# Patient Record
(demographics unavailable — no encounter records)

---

## 2024-12-17 NOTE — HISTORY OF PRESENT ILLNESS
[de-identified] : 67 y/o female presenting with lower back pain x 2 months. Pain is progressively worsening and is aggravated by prolonged sitting or walking. It radiates into her legs, worse on her left side. denies recent illness, fevers, numbness, weakness, balance problems, saddle anesthesia, urinary retention or fecal incontinence. Since previous visit, patient has an acute exacerbation of lower back and bilateral knee pain. She is interested in restarting PT.

## 2024-12-17 NOTE — PROCEDURE
[de-identified] : Procedure: Joint injection with corticosteroid Pre-Procedure Diagnosis: Left knee pain Post-Procedure Diagnosis: same   The patient was educated about the risks and benefits of a corticosteroid injection.  Alternatives were discussed.  The patient understood and consented for the procedure.   The area was sterilely prepped using isopropyl alcohol.  An ethyl chloride spray provided  local anesthesia.  Using the usual sterile technique, 1 ml of 40mg/1ml of Kenalog and 2 ml of Lidocaine 1% without epinephrine was injected into the joint.  A dressing was applied to the area.  The patient tolerated the procedure well and without complication.

## 2024-12-17 NOTE — PHYSICAL EXAM
[de-identified] :  General: No acute distress, conversant, well-nourished. Head: Normocephalic, atraumatic Neck: trachea midline, FROM Heart: normotensive and normal rate and rhythm Lungs: No labored breathing Skin: No abrasions, no rashes, no edema Psych: Alert and oriented to person, place and time Extremities: no peripheral edema or digital cyanosis Gait: Normal gait. Can perform tandem gait.  Vascular: warm and well perfused distally, palpable distal pulses  MSK: Lumbar spine: No tenderness to palpation.  No step-off, no deformity.   NEURO EXAM: Sensation Left L2  -  2/2            Left L3  -  2/2 Left L4  -  2/2 Left L5  -  2/2 Left S1  -  2/2   Right L2  -  2/2            Right L3  -  2/2 Right L4  -  2/2 Right L5  -  2/2 Right S1  -  2/2   Motor: Left L2 (hip flexion)                            5/5                Left L3 (knee extension)                   5/5                Left L4 (ankle dorsiflexion)                 5/5                Left L5 (long toe extensor)                5/5                Left S1 (ankle plantar flexion)           5/5   Right L2 (hip flexion)                            5/5                Right L3 (knee extension)                   5/5                Right L4 (ankle dorsiflexion)                 5/5                Right L5 (long toe extensor)                5/5                Right S1 (ankle plantar flexion)           5/5   Reflexes: Normal and symmetric Negative clonus.  Down-going Babinski.  Knee with no erythema, no effusion.  No tenderness to palpation of knee. No medial joint line tenderness. No lateral joint line tenderness.   Range of motion: 0 - 130 degrees No pain with range of motion.   Negative Art's test. Stable to varus and valgus stress. Negative Lachman's test, negative anterior and posterior drawer tests.    Sensation intact to light touch.  Normal motor exam. Warm and well perfused distally.

## 2024-12-17 NOTE — HISTORY OF PRESENT ILLNESS
[de-identified] : 67 y/o female presenting with lower back pain x 2 months. Pain is progressively worsening and is aggravated by prolonged sitting or walking. It radiates into her legs, worse on her left side. denies recent illness, fevers, numbness, weakness, balance problems, saddle anesthesia, urinary retention or fecal incontinence. Since previous visit, patient has an acute exacerbation of lower back and bilateral knee pain. She is interested in restarting PT.

## 2024-12-17 NOTE — DISCUSSION/SUMMARY
[de-identified] :  I, Dr. Johnson personally performed the evaluation and management services for this established patient who presents today with (a) new problem(s)/exacerbation of (an) existing condition(s). That E/M includes conducting the clinically appropriate interval history &/or exam, assessing all new/exacerbated conditions, and establishing a new plan of care. Today, my ANASTACIO, Stanislaw Bundy was here to observe my evaluation and management service for this new problem/exacerbated condition and follow the plan of care established by me going forward.

## 2024-12-17 NOTE — ASSESSMENT
[FreeTextEntry1] : 69 y/o female presenting with lower back pain x 2 months. Pain is progressively worsening and is aggravated by prolonged sitting or walking. It radiates into her legs, worse on her left side. denies recent illness, fevers, numbness, weakness, balance problems, saddle anesthesia, urinary retention or fecal incontinence. Since previous visit, patient has an acute exacerbation of lower back and bilateral knee pain. She is interested in restarting PT. We discussed treatment options including physical therapy, medications, spinal injections and surgery. Surgery would be lumbar decompression. Patient would like to avoid surgery and injections. The patient was given a referral for physical therapy. Start gabapentin. CSI given to left knee. Patient tolerated the procedure well. If patient sees improvement, she will consider CSI for right knee. F/U in 4 weeks. We discussed red flag symptoms that would require emergent evaluation. She knows to call with any questions or concerns or if her symptoms acutely worsen.

## 2024-12-17 NOTE — REASON FOR VISIT
[Follow-Up Visit] : a follow-up visit for [Back Pain] : back pain [Other: ____] : [unfilled] [FreeTextEntry2] : pain level 5/10, needs new physical therapy referral, need letter for jury duty

## 2024-12-17 NOTE — PROCEDURE
[de-identified] : Procedure: Joint injection with corticosteroid Pre-Procedure Diagnosis: Left knee pain Post-Procedure Diagnosis: same   The patient was educated about the risks and benefits of a corticosteroid injection.  Alternatives were discussed.  The patient understood and consented for the procedure.   The area was sterilely prepped using isopropyl alcohol.  An ethyl chloride spray provided  local anesthesia.  Using the usual sterile technique, 1 ml of 40mg/1ml of Kenalog and 2 ml of Lidocaine 1% without epinephrine was injected into the joint.  A dressing was applied to the area.  The patient tolerated the procedure well and without complication.

## 2024-12-17 NOTE — DISCUSSION/SUMMARY
[de-identified] :  I, Dr. Johnson personally performed the evaluation and management services for this established patient who presents today with (a) new problem(s)/exacerbation of (an) existing condition(s). That E/M includes conducting the clinically appropriate interval history &/or exam, assessing all new/exacerbated conditions, and establishing a new plan of care. Today, my ANASTACIO, Stanislaw Bundy was here to observe my evaluation and management service for this new problem/exacerbated condition and follow the plan of care established by me going forward.

## 2024-12-17 NOTE — PHYSICAL EXAM
[de-identified] :  General: No acute distress, conversant, well-nourished. Head: Normocephalic, atraumatic Neck: trachea midline, FROM Heart: normotensive and normal rate and rhythm Lungs: No labored breathing Skin: No abrasions, no rashes, no edema Psych: Alert and oriented to person, place and time Extremities: no peripheral edema or digital cyanosis Gait: Normal gait. Can perform tandem gait.  Vascular: warm and well perfused distally, palpable distal pulses  MSK: Lumbar spine: No tenderness to palpation.  No step-off, no deformity.   NEURO EXAM: Sensation Left L2  -  2/2            Left L3  -  2/2 Left L4  -  2/2 Left L5  -  2/2 Left S1  -  2/2   Right L2  -  2/2            Right L3  -  2/2 Right L4  -  2/2 Right L5  -  2/2 Right S1  -  2/2   Motor: Left L2 (hip flexion)                            5/5                Left L3 (knee extension)                   5/5                Left L4 (ankle dorsiflexion)                 5/5                Left L5 (long toe extensor)                5/5                Left S1 (ankle plantar flexion)           5/5   Right L2 (hip flexion)                            5/5                Right L3 (knee extension)                   5/5                Right L4 (ankle dorsiflexion)                 5/5                Right L5 (long toe extensor)                5/5                Right S1 (ankle plantar flexion)           5/5   Reflexes: Normal and symmetric Negative clonus.  Down-going Babinski.  Knee with no erythema, no effusion.  No tenderness to palpation of knee. No medial joint line tenderness. No lateral joint line tenderness.   Range of motion: 0 - 130 degrees No pain with range of motion.   Negative Art's test. Stable to varus and valgus stress. Negative Lachman's test, negative anterior and posterior drawer tests.    Sensation intact to light touch.  Normal motor exam. Warm and well perfused distally.

## 2024-12-17 NOTE — ASSESSMENT
[FreeTextEntry1] : 69 y/o female presenting with lower back pain x 2 months. Pain is progressively worsening and is aggravated by prolonged sitting or walking. It radiates into her legs, worse on her left side. denies recent illness, fevers, numbness, weakness, balance problems, saddle anesthesia, urinary retention or fecal incontinence. Since previous visit, patient has an acute exacerbation of lower back and bilateral knee pain. She is interested in restarting PT. We discussed treatment options including physical therapy, medications, spinal injections and surgery. Surgery would be lumbar decompression. Patient would like to avoid surgery and injections. The patient was given a referral for physical therapy. Start gabapentin. CSI given to left knee. Patient tolerated the procedure well. If patient sees improvement, she will consider CSI for right knee. F/U in 4 weeks. We discussed red flag symptoms that would require emergent evaluation. She knows to call with any questions or concerns or if her symptoms acutely worsen.  no suicidal ideation/no depression/no anxiety

## 2025-01-26 NOTE — ASSESSMENT
[FreeTextEntry1] : 67 y/o female followup with lower back pain. Pain is progressively worsening and is aggravated by prolonged sitting or walking. It radiates into her legs, worse on her left side. denies recent illness, fevers, numbness, weakness, balance problems, saddle anesthesia, urinary retention or fecal incontinence. PT has helped. She was given bilateral knee CSI which she tolerated well.  Ordered HA injections.  The patient was given a referral for physical therapy.  Continue gabapentin. F/U for HA injections. We discussed red flag symptoms that would require emergent evaluation. She knows to call with any questions or concerns or if her symptoms acutely worsen.

## 2025-01-26 NOTE — PROCEDURE
[de-identified] : Procedure: Right knee Joint injection with corticosteroid Pre-Procedure Diagnosis: right knee pain Post-Procedure Diagnosis: same  The patient was educated about the risks and benefits of a corticosteroid injection.  Alternatives were discussed.  The patient understood and consented for the procedure.  The area was sterilely prepped using isopropyl alcohol.  An ethyl chloride spray provided  local anesthesia.  Using the usual sterile technique, 1 ml of 40mg/1ml of Kenalog and 4 ml of Lidocaine 1% without epinephrine was injected into the joint.  A dressing was applied to the area.  The patient tolerated the procedure well and without complication.  Procedure: Left knee Joint injection with corticosteroid Pre-Procedure Diagnosis: Left knee pain Post-Procedure Diagnosis: same  The patient was educated about the risks and benefits of a corticosteroid injection.  Alternatives were discussed.  The patient understood and consented for the procedure.  The area was sterilely prepped using isopropyl alcohol.  An ethyl chloride spray provided  local anesthesia.  Using the usual sterile technique, 1 ml of 40mg/1ml of Kenalog and 4 ml of Lidocaine 1% without epinephrine was injected into the joint.  A dressing was applied to the area.  The patient tolerated the procedure well and without complication.

## 2025-01-26 NOTE — HISTORY OF PRESENT ILLNESS
[de-identified] : 67 y/o female followup with lower back pain. Pain is progressively worsening and is aggravated by prolonged sitting or walking. It radiates into her legs, worse on her left side. denies recent illness, fevers, numbness, weakness, balance problems, saddle anesthesia, urinary retention or fecal incontinence. PT has helped. She also reports bilateral knee pain.

## 2025-01-26 NOTE — REASON FOR VISIT
[Initial Visit] : an initial visit for [Other: ____] : [unfilled] [FreeTextEntry2] : Patient states she is not currently doing physical therapy but will be starting soon. Pain level : 4/10.

## 2025-01-26 NOTE — PROCEDURE
[de-identified] : Procedure: Right knee Joint injection with corticosteroid Pre-Procedure Diagnosis: right knee pain Post-Procedure Diagnosis: same  The patient was educated about the risks and benefits of a corticosteroid injection.  Alternatives were discussed.  The patient understood and consented for the procedure.  The area was sterilely prepped using isopropyl alcohol.  An ethyl chloride spray provided  local anesthesia.  Using the usual sterile technique, 1 ml of 40mg/1ml of Kenalog and 4 ml of Lidocaine 1% without epinephrine was injected into the joint.  A dressing was applied to the area.  The patient tolerated the procedure well and without complication.  Procedure: Left knee Joint injection with corticosteroid Pre-Procedure Diagnosis: Left knee pain Post-Procedure Diagnosis: same  The patient was educated about the risks and benefits of a corticosteroid injection.  Alternatives were discussed.  The patient understood and consented for the procedure.  The area was sterilely prepped using isopropyl alcohol.  An ethyl chloride spray provided  local anesthesia.  Using the usual sterile technique, 1 ml of 40mg/1ml of Kenalog and 4 ml of Lidocaine 1% without epinephrine was injected into the joint.  A dressing was applied to the area.  The patient tolerated the procedure well and without complication.

## 2025-01-26 NOTE — PHYSICAL EXAM
[de-identified] : General: No acute distress, conversant, well-nourished. Head: Normocephalic, atraumatic Neck: trachea midline, FROM Heart: normotensive and normal rate and rhythm Lungs: No labored breathing Skin: No abrasions, no rashes, no edema Psych: Alert and oriented to person, place and time Extremities: no peripheral edema or digital cyanosis Gait: Normal gait. Can perform tandem gait.   Vascular: warm and well perfused distally, palpable distal pulses Bilateral Knee with no erythema, no effusion.   No tenderness to palpation of knee. No medial joint line tenderness. No lateral joint line tenderness.  Range of motion: 0 - 130 degrees No pain with range of motion.  Negative Art's test. Stable to varus and valgus stress. Negative Lachman's test, negative anterior and posterior drawer tests.    Sensation intact to light touch.   Normal motor exam. Warm and well perfused distally. MSK: Bilateral Knee with no erythema, no effusion.   No tenderness to palpation of knee. No medial joint line tenderness. No lateral joint line tenderness.  Range of motion: 0 - 130 degrees + pain with range of motion.  Negative Art's test. Stable to varus and valgus stress. Negative Lachman's test, negative anterior and posterior drawer tests.    Sensation intact to light touch.   Normal motor exam. Warm and well perfused distally. Lumbar spine: No tenderness to palpation.  No step-off, no deformity.  NEURO EXAM: Sensation  Left L2  -  2/2             Left L3  -  2/2 Left L4  -  2/2 Left L5  -  2/2 Left S1  -  2/2  Right L2  -  2/2             Right L3  -  2/2 Right L4  -  2/2 Right L5  -  2/2 Right S1  -  2/2  Motor:  Left L2 (hip flexion)                            5/5                 Left L3 (knee extension)                   5/5                 Left L4 (ankle dorsiflexion)                 5/5                 Left L5 (long toe extensor)                5/5                 Left S1 (ankle plantar flexion)           5/5  Right L2 (hip flexion)                            5/5                 Right L3 (knee extension)                   5/5                 Right L4 (ankle dorsiflexion)                 5/5                 Right L5 (long toe extensor)                5/5                 Right S1 (ankle plantar flexion)           5/5  Reflexes: Normal and symmetric Negative clonus.  Down-going Babinski.    [de-identified] : I ordered radiographs to evaluate the patient's symptoms. Bilateral knee 3 view radiographs obtained in the office today shows no fracture or dislocation.  Age-related degenerative changes.  I independently reviewed her lumbar MRI which shows degenerative changes including severe spinal stenosis.     lumbar MRI from 6/25/24 IMPRESSION:  1. 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  2. L2-3 moderate to severe spinal canal stenosis with right lateral recess narrowing.  3. L3-4 severe spinal canal stenosis and moderate right neural foraminal narrowing.  4. L4-5 moderate spinal canal stenosis with left lateral recess narrowing and moderate left neural foraminal narrowing.

## 2025-01-26 NOTE — HISTORY OF PRESENT ILLNESS
[de-identified] : 67 y/o female followup with lower back pain. Pain is progressively worsening and is aggravated by prolonged sitting or walking. It radiates into her legs, worse on her left side. denies recent illness, fevers, numbness, weakness, balance problems, saddle anesthesia, urinary retention or fecal incontinence. PT has helped. She also reports bilateral knee pain. Yes

## 2025-01-26 NOTE — PHYSICAL EXAM
[de-identified] : General: No acute distress, conversant, well-nourished. Head: Normocephalic, atraumatic Neck: trachea midline, FROM Heart: normotensive and normal rate and rhythm Lungs: No labored breathing Skin: No abrasions, no rashes, no edema Psych: Alert and oriented to person, place and time Extremities: no peripheral edema or digital cyanosis Gait: Normal gait. Can perform tandem gait.   Vascular: warm and well perfused distally, palpable distal pulses Bilateral Knee with no erythema, no effusion.   No tenderness to palpation of knee. No medial joint line tenderness. No lateral joint line tenderness.  Range of motion: 0 - 130 degrees No pain with range of motion.  Negative Art's test. Stable to varus and valgus stress. Negative Lachman's test, negative anterior and posterior drawer tests.    Sensation intact to light touch.   Normal motor exam. Warm and well perfused distally. MSK: Bilateral Knee with no erythema, no effusion.   No tenderness to palpation of knee. No medial joint line tenderness. No lateral joint line tenderness.  Range of motion: 0 - 130 degrees + pain with range of motion.  Negative Art's test. Stable to varus and valgus stress. Negative Lachman's test, negative anterior and posterior drawer tests.    Sensation intact to light touch.   Normal motor exam. Warm and well perfused distally. Lumbar spine: No tenderness to palpation.  No step-off, no deformity.  NEURO EXAM: Sensation  Left L2  -  2/2             Left L3  -  2/2 Left L4  -  2/2 Left L5  -  2/2 Left S1  -  2/2  Right L2  -  2/2             Right L3  -  2/2 Right L4  -  2/2 Right L5  -  2/2 Right S1  -  2/2  Motor:  Left L2 (hip flexion)                            5/5                 Left L3 (knee extension)                   5/5                 Left L4 (ankle dorsiflexion)                 5/5                 Left L5 (long toe extensor)                5/5                 Left S1 (ankle plantar flexion)           5/5  Right L2 (hip flexion)                            5/5                 Right L3 (knee extension)                   5/5                 Right L4 (ankle dorsiflexion)                 5/5                 Right L5 (long toe extensor)                5/5                 Right S1 (ankle plantar flexion)           5/5  Reflexes: Normal and symmetric Negative clonus.  Down-going Babinski.    [de-identified] : I ordered radiographs to evaluate the patient's symptoms. Bilateral knee 3 view radiographs obtained in the office today shows no fracture or dislocation.  Age-related degenerative changes.  I independently reviewed her lumbar MRI which shows degenerative changes including severe spinal stenosis.     lumbar MRI from 6/25/24 IMPRESSION:  1. 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  2. L2-3 moderate to severe spinal canal stenosis with right lateral recess narrowing.  3. L3-4 severe spinal canal stenosis and moderate right neural foraminal narrowing.  4. L4-5 moderate spinal canal stenosis with left lateral recess narrowing and moderate left neural foraminal narrowing.

## 2025-02-12 NOTE — REASON FOR VISIT
[Follow-Up Visit] : a follow-up visit for [Other: ____] : [unfilled] [FreeTextEntry2] : Patient is in office for gel injections. Pain level 1/10.

## 2025-02-12 NOTE — PHYSICAL EXAM
[de-identified] : General: No acute distress, conversant, well-nourished. Head: Normocephalic, atraumatic Neck: trachea midline, FROM Heart: normotensive and normal rate and rhythm Lungs: No labored breathing Skin: No abrasions, no rashes, no edema Psych: Alert and oriented to person, place and time Extremities: no peripheral edema or digital cyanosis Gait: Normal gait. Can perform tandem gait.   Vascular: warm and well perfused distally, palpable distal pulses  Bilateral Knee with no erythema, no effusion.   + tenderness to palpation of knee.  Range of motion: 0 - 130 degrees + pain with range of motion.  Sensation intact to light touch.   Normal motor exam. Warm and well perfused distally.

## 2025-02-12 NOTE — ASSESSMENT
[FreeTextEntry1] : 69 y/o female followup for bilateral knee pain.  Here for monovisc injections. She was given bilateral monovisc injections which she tolerated well.  F/U 3 months. We discussed red flag symptoms that would require emergent evaluation. She knows to call with any questions or concerns or if her symptoms acutely worsen.

## 2025-02-12 NOTE — PROCEDURE
[de-identified] : Procedure: Right knee Joint injection with Monovisc Pre-Procedure Diagnosis: Right knee pain Post-Procedure Diagnosis: same  The patient was educated about the risks and benefits of an Monovisc injection.  Alternatives were discussed.  The patient understood and consented for the procedure.  The area was sterilely prepped using isopropyl alcohol.  An ethyl chloride spray provided  local anesthesia.  Using the usual sterile technique, 1 unit of monovisc was injected into the joint.  A dressing was applied to the area.  The patient tolerated the procedure well and without complication.  Procedure: Left knee Joint injection with Monovisc Pre-Procedure Diagnosis: Left knee pain Post-Procedure Diagnosis: same  The patient was educated about the risks and benefits of an Monovisc injection.  Alternatives were discussed.  The patient understood and consented for the procedure.  The area was sterilely prepped using isopropyl alcohol.  An ethyl chloride spray provided  local anesthesia.  Using the usual sterile technique, 1 unit of monovisc was injected into the joint.  A dressing was applied to the area.  The patient tolerated the procedure well and without complication.

## 2025-02-12 NOTE — HISTORY OF PRESENT ILLNESS
[de-identified] : 69 y/o female followup for bilateral knee pain.  Here for monovisc injections.   Monovisc Gel Injections Lot #[id number] Exp. 9/10/2027

## 2025-02-23 NOTE — HISTORY OF PRESENT ILLNESS
[de-identified] : 67 y/o female followup for bilateral knee pain. She reports blisters on her left leg after receiving a monovisc injection. They are improving.  There were no skin changes on the right leg.  She denies radicular pain, recent illness, fevers, numbness, weakness, balance problems, saddle anesthesia, urinary retention or fecal incontinence.

## 2025-02-23 NOTE — PHYSICAL EXAM
[de-identified] : General: No acute distress, conversant, well-nourished. Head: Normocephalic, atraumatic Neck: trachea midline, FROM Heart: normotensive and normal rate and rhythm Lungs: No labored breathing Psych: Alert and oriented to person, place and time Extremities: no peripheral edema or digital cyanosis Gait: Normal gait. Can perform tandem gait.   Vascular: warm and well perfused distally, palpable distal pulses  Bilateral Knee with no erythema, no effusion.   Left knee with blisters, compartments soft and compressible, no erythema no tenderness to palpation of knee.  Range of motion: 0 - 130 degrees no pain with range of motion.  Sensation intact to light touch.   Normal motor exam. Warm and well perfused distally.

## 2025-02-23 NOTE — REASON FOR VISIT
[Follow-Up Visit] : a follow-up visit for [Other: ____] : [unfilled] [FreeTextEntry2] : experiencing bumps/blisters in left knee after monovisc injection

## 2025-02-23 NOTE — HISTORY OF PRESENT ILLNESS
[de-identified] : 67 y/o female followup for bilateral knee pain. She reports blisters on her left leg after receiving a monovisc injection. They are improving.  There were no skin changes on the right leg.  She denies radicular pain, recent illness, fevers, numbness, weakness, balance problems, saddle anesthesia, urinary retention or fecal incontinence.

## 2025-02-23 NOTE — ASSESSMENT
[FreeTextEntry1] : 69 y/o female followup for bilateral knee pain. She reports blisters on her left leg after receiving a monovisc injection. They are improving.  There were no skin changes on the right leg.  She denies radicular pain, recent illness, fevers, numbness, weakness, balance problems, saddle anesthesia, urinary retention or fecal incontinence.  She likely had an allergic reaction to the monovisc.  Advised to make HA injections an allergy.  She can take celebrex as needed. Given referral to dermatologist.  She can see her PCP for blisters.  Discussed local care.  F/U in 3-4 weeks. We discussed red flag symptoms that would require emergent evaluation. She knows to call with any questions or concerns or if her symptoms acutely worsen.

## 2025-02-23 NOTE — PHYSICAL EXAM
[de-identified] : General: No acute distress, conversant, well-nourished. Head: Normocephalic, atraumatic Neck: trachea midline, FROM Heart: normotensive and normal rate and rhythm Lungs: No labored breathing Psych: Alert and oriented to person, place and time Extremities: no peripheral edema or digital cyanosis Gait: Normal gait. Can perform tandem gait.   Vascular: warm and well perfused distally, palpable distal pulses  Bilateral Knee with no erythema, no effusion.   Left knee with blisters, compartments soft and compressible, no erythema no tenderness to palpation of knee.  Range of motion: 0 - 130 degrees no pain with range of motion.  Sensation intact to light touch.   Normal motor exam. Warm and well perfused distally.

## 2025-02-23 NOTE — ASSESSMENT
[FreeTextEntry1] : 67 y/o female followup for bilateral knee pain. She reports blisters on her left leg after receiving a monovisc injection. They are improving.  There were no skin changes on the right leg.  She denies radicular pain, recent illness, fevers, numbness, weakness, balance problems, saddle anesthesia, urinary retention or fecal incontinence.  She likely had an allergic reaction to the monovisc.  Advised to make HA injections an allergy.  She can take celebrex as needed. Given referral to dermatologist.  She can see her PCP for blisters.  Discussed local care.  F/U in 3-4 weeks. We discussed red flag symptoms that would require emergent evaluation. She knows to call with any questions or concerns or if her symptoms acutely worsen.

## 2025-03-18 NOTE — DISCUSSION/SUMMARY
[de-identified] :  I, Dr. Johnson personally performed the evaluation and management services for this established patient who presents today with (a) new problem(s)/exacerbation of (an) existing condition(s). That E/M includes conducting the clinically appropriate interval history &/or exam, assessing all new/exacerbated conditions, and establishing a new plan of care. Today, my ANASTACIO, Stanislaw Bundy was here to observe my evaluation and management service for this new problem/exacerbated condition and follow the plan of care established by me going forward.

## 2025-03-18 NOTE — PHYSICAL EXAM
[de-identified] : General: No acute distress, conversant, well-nourished. Head: Normocephalic, atraumatic Neck: trachea midline, FROM Heart: normotensive and normal rate and rhythm Lungs: No labored breathing Psych: Alert and oriented to person, place and time Extremities: no peripheral edema or digital cyanosis Gait: Normal gait. Can perform tandem gait. Vascular: warm and well perfused distally, palpable distal pulses   MSK: Lumbar spine: No tenderness to palpation.  No step-off, no deformity.   NEURO EXAM: Sensation Left L2  -  2/2            Left L3  -  2/2 Left L4  -  2/2 Left L5  -  2/2 Left S1  -  2/2   Right L2  -  2/2            Right L3  -  2/2 Right L4  -  2/2 Right L5  -  2/2 Right S1  -  2/2   Motor: Left L2 (hip flexion)                            5/5                Left L3 (knee extension)                   5/5                Left L4 (ankle dorsiflexion)                 5/5                Left L5 (long toe extensor)                5/5                Left S1 (ankle plantar flexion)           5/5   Right L2 (hip flexion)                            5/5                Right L3 (knee extension)                   5/5                Right L4 (ankle dorsiflexion)                 5/5                Right L5 (long toe extensor)                5/5                Right S1 (ankle plantar flexion)           5/5   Reflexes: Normal and symmetric Negative clonus.  Down-going Babinski.  Bilateral Knee with no erythema, no effusion. Left knee with healed blisters. Compartments soft and compressible, no erythema no tenderness to palpation of knee.  Range of motion: 0 - 130 degrees no pain with range of motion.  Sensation intact to light touch. Normal motor exam. Warm and well perfused distally.

## 2025-03-18 NOTE — REASON FOR VISIT
[Follow-Up Visit] : a follow-up visit for [Back Pain] : back pain [Other: ____] : [unfilled] [FreeTextEntry2] : needs new physical therapy referral, pain level 4/10

## 2025-03-18 NOTE — DISCUSSION/SUMMARY
[de-identified] :  I, Dr. Johnson personally performed the evaluation and management services for this established patient who presents today with (a) new problem(s)/exacerbation of (an) existing condition(s). That E/M includes conducting the clinically appropriate interval history &/or exam, assessing all new/exacerbated conditions, and establishing a new plan of care. Today, my ANASTACIO, Stanislaw Bundy was here to observe my evaluation and management service for this new problem/exacerbated condition and follow the plan of care established by me going forward.

## 2025-03-18 NOTE — HISTORY OF PRESENT ILLNESS
[de-identified] : 69 y/o female followup for bilateral knee pain. She reports blisters on her left leg after receiving a monovisc injection. They are improving.  There were no skin changes on the right leg.  She denies radicular pain, recent illness, fevers, numbness, weakness, balance problems, saddle anesthesia, urinary retention or fecal incontinence. Since previous visit, left knee pain and skin reaction are improving. She is seeing a dermatologist. Her lower back continues to bother her.

## 2025-03-18 NOTE — PHYSICAL EXAM
[de-identified] : General: No acute distress, conversant, well-nourished. Head: Normocephalic, atraumatic Neck: trachea midline, FROM Heart: normotensive and normal rate and rhythm Lungs: No labored breathing Psych: Alert and oriented to person, place and time Extremities: no peripheral edema or digital cyanosis Gait: Normal gait. Can perform tandem gait. Vascular: warm and well perfused distally, palpable distal pulses   MSK: Lumbar spine: No tenderness to palpation.  No step-off, no deformity.   NEURO EXAM: Sensation Left L2  -  2/2            Left L3  -  2/2 Left L4  -  2/2 Left L5  -  2/2 Left S1  -  2/2   Right L2  -  2/2            Right L3  -  2/2 Right L4  -  2/2 Right L5  -  2/2 Right S1  -  2/2   Motor: Left L2 (hip flexion)                            5/5                Left L3 (knee extension)                   5/5                Left L4 (ankle dorsiflexion)                 5/5                Left L5 (long toe extensor)                5/5                Left S1 (ankle plantar flexion)           5/5   Right L2 (hip flexion)                            5/5                Right L3 (knee extension)                   5/5                Right L4 (ankle dorsiflexion)                 5/5                Right L5 (long toe extensor)                5/5                Right S1 (ankle plantar flexion)           5/5   Reflexes: Normal and symmetric Negative clonus.  Down-going Babinski.  Bilateral Knee with no erythema, no effusion. Left knee with healed blisters. Compartments soft and compressible, no erythema no tenderness to palpation of knee.  Range of motion: 0 - 130 degrees no pain with range of motion.  Sensation intact to light touch. Normal motor exam. Warm and well perfused distally.

## 2025-03-18 NOTE — HISTORY OF PRESENT ILLNESS
[de-identified] : 67 y/o female followup for bilateral knee pain. She reports blisters on her left leg after receiving a monovisc injection. They are improving.  There were no skin changes on the right leg.  She denies radicular pain, recent illness, fevers, numbness, weakness, balance problems, saddle anesthesia, urinary retention or fecal incontinence. Since previous visit, left knee pain and skin reaction are improving. She is seeing a dermatologist. Her lower back continues to bother her.

## 2025-03-18 NOTE — ASSESSMENT
[FreeTextEntry1] : 67 y/o female followup for bilateral knee pain. She reports blisters on her left leg after receiving a monovisc injection. They are improving.  There were no skin changes on the right leg.  She denies radicular pain, recent illness, fevers, numbness, weakness, balance problems, saddle anesthesia, urinary retention or fecal incontinence. Since previous visit, left knee pain and skin reaction are improving. She is seeing a dermatologist. Her lower back continues to bother her. The patient was given a referral for physical therapy. F/U in 4-6 weeks. We discussed red flag symptoms that would require emergent evaluation. She knows to call with any questions or concerns or if her symptoms acutely worsen.